# Patient Record
Sex: MALE
[De-identification: names, ages, dates, MRNs, and addresses within clinical notes are randomized per-mention and may not be internally consistent; named-entity substitution may affect disease eponyms.]

---

## 2018-06-27 PROBLEM — M75.42 IMPINGEMENT SYNDROME OF LEFT SHOULDER: Status: ACTIVE | Noted: 2018-06-27

## 2020-11-29 ENCOUNTER — TELEPHONE (OUTPATIENT)
Dept: SCHEDULING | Age: 55
End: 2020-11-29

## 2021-03-16 ENCOUNTER — LAB ENCOUNTER (OUTPATIENT)
Dept: LAB | Facility: HOSPITAL | Age: 56
End: 2021-03-16
Attending: SURGERY
Payer: COMMERCIAL

## 2021-03-16 DIAGNOSIS — C43.9 MELANOMA (HCC): Primary | ICD-10-CM

## 2021-03-17 PROCEDURE — 88321 CONSLTJ&REPRT SLD PREP ELSWR: CPT

## 2021-03-24 ENCOUNTER — OFFICE VISIT (OUTPATIENT)
Dept: SURGERY | Facility: CLINIC | Age: 56
End: 2021-03-24
Payer: COMMERCIAL

## 2021-03-24 VITALS
DIASTOLIC BLOOD PRESSURE: 92 MMHG | BODY MASS INDEX: 29 KG/M2 | RESPIRATION RATE: 16 BRPM | WEIGHT: 194 LBS | SYSTOLIC BLOOD PRESSURE: 128 MMHG | HEART RATE: 80 BPM | OXYGEN SATURATION: 95 %

## 2021-03-24 DIAGNOSIS — C43.62 MALIGNANT MELANOMA OF LEFT UPPER EXTREMITY INCLUDING SHOULDER (HCC): Primary | ICD-10-CM

## 2021-03-24 PROCEDURE — 3080F DIAST BP >= 90 MM HG: CPT | Performed by: SURGERY

## 2021-03-24 PROCEDURE — 99245 OFF/OP CONSLTJ NEW/EST HI 55: CPT | Performed by: SURGERY

## 2021-03-24 PROCEDURE — 3074F SYST BP LT 130 MM HG: CPT | Performed by: SURGERY

## 2021-03-24 NOTE — CONSULTS
[facility]        Patient Name:  Pablito Al   YOB: 1965   Gender:  Male   Appt Date:  3/24/2021   Provider:  Kennedy Pichardo MD     PATIENT PROVIDERS  Referring Provider: Karla Carl MD    Primary Care Pr History:   Diagnosis Date   • High blood pressure    • PD (Parkinson's disease) ([facility] [address]    • Sleep apnea    • Unspecified essential hypertension       Reviewed:  Past Surgical History:   Procedure Laterality Date   • Other surgical history      zofia shoulder The biopsy site is well healed. There are no satellite lesions or intransit metastases. Document Review:  [facility] Dermatopathology Report, [id number], reported 3/11/2021:   At least 0.4 mm thick melanoma, nonulcerated, peripheral margin i excision during subsequent clinic visit. Patient had ample time to ask questions. Follow Up: To schedule surgery        Electronically Signed by:     Jannette Martinez.  Sheridan Barnett MD FACS  Chief of [facility]  Professor of Surgery, [address]  [phone]

## 2021-03-24 NOTE — PATIENT INSTRUCTIONS
1.) Your procedure is scheduled for Wednesday April 7th, 2021 at 10 AM @ [address] [place] [address] [place], [address] Arrive at 9:45 AM.   2.) Please take 1000 mg of tylenol prior to procedure for pain control.   3.) You will require a COVID test prior to p

## 2021-03-30 ENCOUNTER — TELEPHONE (OUTPATIENT)
Dept: SURGERY | Facility: CLINIC | Age: 56
End: 2021-03-30

## 2021-03-30 NOTE — TELEPHONE ENCOUNTER
Called patient about COVID testing prior to procedure that he will need to arrive an hour prior to procedure

## 2021-04-06 ENCOUNTER — LAB ENCOUNTER (OUTPATIENT)
Dept: LAB | Facility: HOSPITAL | Age: 56
End: 2021-04-06
Attending: SURGERY
Payer: COMMERCIAL

## 2021-04-06 DIAGNOSIS — C43.9 MALIGNANT MELANOMA, UNSPECIFIED SITE (HCC): ICD-10-CM

## 2021-04-06 DIAGNOSIS — Z78.9 1 MINUTE APGAR SCORE 0: Primary | ICD-10-CM

## 2021-04-07 ENCOUNTER — OFFICE VISIT (OUTPATIENT)
Dept: SURGERY | Facility: CLINIC | Age: 56
End: 2021-04-07
Payer: COMMERCIAL

## 2021-04-07 VITALS
WEIGHT: 195.63 LBS | SYSTOLIC BLOOD PRESSURE: 148 MMHG | HEART RATE: 72 BPM | RESPIRATION RATE: 16 BRPM | DIASTOLIC BLOOD PRESSURE: 93 MMHG | OXYGEN SATURATION: 96 % | BODY MASS INDEX: 29 KG/M2 | TEMPERATURE: 96 F

## 2021-04-07 DIAGNOSIS — C43.62 MALIGNANT MELANOMA OF LEFT UPPER EXTREMITY INCLUDING SHOULDER (HCC): ICD-10-CM

## 2021-04-07 DIAGNOSIS — C43.9 MELANOMA (HCC): Primary | ICD-10-CM

## 2021-04-07 PROCEDURE — 13121 CMPLX RPR S/A/L 2.6-7.5 CM: CPT | Performed by: SURGERY

## 2021-04-07 PROCEDURE — 3077F SYST BP >= 140 MM HG: CPT | Performed by: SURGERY

## 2021-04-07 PROCEDURE — 88305 TISSUE EXAM BY PATHOLOGIST: CPT | Performed by: SURGERY

## 2021-04-07 PROCEDURE — 11604 EXC TR-EXT MAL+MARG 3.1-4 CM: CPT | Performed by: SURGERY

## 2021-04-07 PROCEDURE — 3080F DIAST BP >= 90 MM HG: CPT | Performed by: SURGERY

## 2021-04-07 NOTE — PROCEDURES
Surgical Oncology Procedure Note    Preoperative diagnosis: Malignant melanoma,  left upper arm    Postoperative diagnosis: Malignant melanoma,  left upper arm    Procedure:  1. Wide excision of malignant melanoma left upper arm, 3.2 cm  2.

## 2021-04-08 ENCOUNTER — TELEPHONE (OUTPATIENT)
Dept: SURGERY | Facility: CLINIC | Age: 56
End: 2021-04-08

## 2021-04-12 ENCOUNTER — TELEPHONE (OUTPATIENT)
Dept: SURGERY | Facility: CLINIC | Age: 56
End: 2021-04-12

## 2021-04-12 NOTE — TELEPHONE ENCOUNTER
Called patient to review surgical pathology.   Final Diagnosis:   Skin, left arm, excision:  -Focal residual melanoma in situ.  -The margins of excision are negative for tumor.  -Scar, consistent with prior procedure      Electronically signed by Harsh Jovel with Dr. Wilmar Spain. Patient understands the importance of self skin examination between visits, signs and symptoms of skin cancer, as well as appropriate sun avoidance and sunscreen use.  Patient is to continue monthly self skin examination and examinati

## 2021-06-08 ENCOUNTER — TELEPHONE (OUTPATIENT)
Dept: SCHEDULING | Age: 56
End: 2021-06-08

## 2021-06-08 DIAGNOSIS — Z13.6 SCREENING FOR CARDIOVASCULAR CONDITION: Primary | ICD-10-CM

## 2021-07-30 ENCOUNTER — HOSPITAL ENCOUNTER (OUTPATIENT)
Dept: CT IMAGING | Age: 56
End: 2021-07-30

## 2021-08-04 PROBLEM — M75.22 BICEPS TENDONITIS ON LEFT: Status: ACTIVE | Noted: 2021-08-04

## (undated) NOTE — LETTER
Patient Name: Reshma Geronimo        : 1965       Medical Record #: [id number]    CONSENT FOR PROCEDURES/SEDATION    Date: 2021       Time: 9:15 AM        1. I authorize the performance upon Reshma Geronimo the following:     Wide excision